# Patient Record
Sex: FEMALE | Race: BLACK OR AFRICAN AMERICAN | Employment: UNEMPLOYED | URBAN - METROPOLITAN AREA
[De-identification: names, ages, dates, MRNs, and addresses within clinical notes are randomized per-mention and may not be internally consistent; named-entity substitution may affect disease eponyms.]

---

## 2020-02-05 ENCOUNTER — HOSPITAL ENCOUNTER (OUTPATIENT)
Dept: PHYSICAL THERAPY | Age: 75
Setting detail: THERAPIES SERIES
Discharge: HOME | End: 2020-02-05
Payer: MEDICARE

## 2020-02-05 PROCEDURE — 97530 THERAPEUTIC ACTIVITIES: CPT | Performed by: CHIROPRACTOR

## 2020-02-05 PROCEDURE — 97161 PT EVAL LOW COMPLEX 20 MIN: CPT | Performed by: CHIROPRACTOR

## 2020-02-05 ASSESSMENT — PAIN SCALES - QUEBEC BACK PAIN DISABILITY SCALE
CLIMB ONE FLIGHT OF STAIRS: 5
WALK SEVERAL KILOMETERS  OR MILES: 5
QUEBEC DISABILITY INDEX: 80-99%
TURN OVER IN BED: 4
BEND OVER TO CLEAN THE BATHTUB: 5
STAND UP FOR 20 TO 30 MINUTES: 4
MOVE A CHAIR: 5
RIDE IN A CAR: 1
THROW A BALL: 5
MAKE YOUR BED: 2
LIFT AND CARRY A HEAVY SUITCASE: 5
SIT IN A CHAIR FOR SEVERAL HOURS: 3
QUEBEC CMS MODIFIER: CM
REACH UP TO HIGH SHELVES: 5
CARRY TWO BAGS OF GROCERIES: 5
PULL OR PUSH HEAVY DOORS: 5
WALK A FEW BLOCKS OR 300 TO 400M: 5
RUN ONE BLOCK OR 100M: 5
TAKE FOOD OUT OF THE REFRIGERATOR: 1
PUT ON SOCKS OR PANYHOSE: 2
GET OUT OF BED: 4
SLEEP THROUGH THE NIGHT: 5
TOTAL SCORE: 81

## 2020-02-05 NOTE — FLOWSHEET NOTE
Physical Therapy Daily Treatment Note  Date:  2020    Patient Name:  Duke Chapa    :  1945  MRN: [id number]    Restrictions/Precautions:  Restrictions/Precautions  Restrictions/Precautions: Fall Risk(Low fall risk)  Pertinent Medical History: Additional Pertinent Hx: B Shoulder pain ( Rot Cuff tears per pt), Neck pain, Fibromyalgia    Medical/Treatment Diagnosis Information:  · Diagnosis: Lumbar Spinal Stenosis, OA R ankle  ·      Insurance/Certification information:   [facility] [employer] Medicare  Physician Information:     Dr. Tiffanie French of care signed (Y/N): Faxed    Visit# / total visits:  1/  Pain level: Ranges 7-10/10     Functional Outcomes Measure:  Test: [place]  Score:   81    Progress Note: []  Yes  []  No  Next due by: Visit #10      History of Injury:   States ankle pain and progressive OA following R ankle fx > 30 yrs ago,, LBP increasing                                          over the last 5 yrs     Subjective:    C/o pain in all her joints    Objective:   Observation:    Test measurements:      Exercises:  Exercise/Equipment Resistance/Repetitions Other comments        Reviewed home exer, posture and body mechanics          Given pool tour                                                          Other Therapeutic Activities:      Home Exercise Program:  Voiced understanding of home exer    Manual Treatments:     Modalities:     Progression Towards Functional goals:  [] Patient is progressing as expected towards functional goals listed. [] Progression is slowed due to complexities listed. [] Progression has been slowed due to co-morbidities. [x] Plan just implemented, too soon to assess goals progression  [] Other:    Charges: Therapeutic Exercise:  [] (95186) Provided verbal/tactile cueing for activities to restore or maintain strength, flexibility, endurance, ROM for improvements with self-care, mobility, lifting and ambulation.     Neuromuscular Re-Education  [] (02400) Provided verbal/tactile cueing for activities to restore or maintain balance, coordination, kinesthetic sense, posture, motor skill, proprioception for self-care, mobility, lifting, and ambulation. Therapeutic Activities:    [x] (63850) Provided verbal/tactile cueing to address functional limitations related to loss of mobility, strength, balance, and coordination. Gait Training:  [] (98163) Provided training and instruction to the patient for proper postural muscle recruitment and positioning with ambulation re-education     Home Exercise Program:    [] (29267) Reviewed/Progressed HEP activities related to strengthening, flexibility, endurance, ROM for functional self-care, mobility, lifting and ambulation   [] (69457) Reviewed/Progressed HEP activities related to improving balance, coordination, kinesthetic sense, posture, motor skill, proprioception for self-care, mobility, lifting, and ambulation      Manual Treatments:  MFR / STM / Oscillations-Mobs:  G-I, II, III, IV / Manipulation / MLD  [] (58434) Provided manual therapy to mobilize  soft tissue/joints/fluid for the purpose of modulating pain, promoting relaxation, increasing ROM, reducing/eliminating soft tissue swelling/inflammation/restriction, improving soft tissue extensibility and allowing for proper ROM for normal function with self- care, mobility, lifting and ambulation.         Timed Code Treatment Minutes: 30   Total Treatment Minutes: 45     [x] EVAL (LOW) 43820   [] EVAL (MOD) 45757   [] EVAL (HIGH) 42353   [] RE-EVAL   [] TE (33969) x     [] Aquatic (41314) x  [] NMR (99541)   x  [] Aquatic Group (97359) x  [] Manual (84251) x    [] Ultrasound (84460) x  [x] TA (03399) x 2  [] Mech Traction (87028)  [] Ionto (39116)           [] ES (un) (68210):   [] Vasopump (63615) [] Other:      Assessment  [] Patient tolerated treatment well [] Patient limited by fatigue  [x] Patient limited by pain  [] Patient limited by other medical

## 2020-02-05 NOTE — PROGRESS NOTES
Physical Therapy  Initial Assessment  Date: 2020  Patient Name: Alexandru Leung  MRN: [id number]  : 1945          Restrictions  Restrictions/Precautions  Restrictions/Precautions: Fall Risk(Low fall risk)    Subjective   General  Chart Reviewed: Yes  Patient assessed for rehabilitation services?: Yes  Additional Pertinent Hx: B Shoulder pain ( Rot Cuff tears per pt), Neck pain, Fibromyalgia  Family / Caregiver Present: Yes  Referring Practitioner: Dr. Solomon Silvestre  Referral Date : 20  Diagnosis: Lumbar Spinal Stenosis, OA R ankle  PT Visit Information  Onset Date: (States ankle pain and progressive OA following R ankle fx > 30 yrs ago,, LBP increasing ofver the last 5 yrs )  PT Insurance Information: [employer] Medicare  Total # of Visits to Date: 1  Subjective  Subjective: C/o pain in all her joints  Pain Screening  Patient Currently in Pain: (Ranges between 7/10 and 10/10)     Objective          PROM RLE (degrees)  RLE PROM: (Ankle DF 0, PF 0-30)  PROM LLE (degrees)  LLE PROM: (Ankle DF 0-12, PF 0-50)  PROM RUE (degrees)  RUE PROM: (Shoulder flex 0-60, abd 0-50)  PROM LUE (degrees)  LUE PROM: (Shoulder flex 0-60, abd 0-50)  Spine  Cervical: Decreased 25% in flex, 50% in ext and rotation and 75% in SB  Thoracic:    Lumbar:   Lumbar mobility decreased 50% in flex and > 75% in ext and SB    Strength RLE  Strength RLE: (ankle DF/PF 3-/5, knee flex/ext 4+/5, hip flex 4/5, ext 5/5)  Strength LLE  Strength LLE: (ankle DF/PF 3+/5, knee flex/ext 4+/5, hip flex 4/5, ext 5/5)  Strength RUE  Strength RUE: (Shoulder flex/abd 3-/5 )  Strength LUE  Strength LUE: (Shoulder flex/abd 3-/5)     Additional Measures  Other: Unable to lie supine  Sensation  Overall Sensation Status: [facility]             Ambulation  Ambulation?: (Amb without any assistive device, but with a limp due to R ankle pain and decreased mobility)                            Assessment   Conditions Requiring Skilled Therapeutic Intervention  Body structures, Functions, Activity limitations: Decreased ROM; Increased pain;Decreased strength  Assessment: Prior level of function: Able to complete usual ADL activities  Prognosis: Good  Decision Making: Low Complexity  REQUIRES PT FOLLOW UP: Yes  Activity Tolerance  Activity Tolerance: Patient limited by pain         Plan   Plan  Times per week: 2-3x/wk x 8-12 wks  Current Treatment Recommendations: Strengthening, ROM, Home Exercise Program, Aquatics         OutComes Score  [place] Total Score: 81 (02/05/20 1447)                                               AM-PAC Score             Goals  Short term goals  Time Frame for Short term goals: 6 weeks  Short term goal 1: Be Independent with home exer  Short term goal 2: Decrease pain 25-50%  Long term goals  Time Frame for Long term goals : 12 weeks  Long term goal 1: Decrease pain 50-75%  Long term goal 2: Be able to amb with good gait pattern  Patient Goals   Patient goals :  \"Less pain\"        Mushtaq LOBATO#[id number]

## 2020-02-13 ENCOUNTER — HOSPITAL ENCOUNTER (OUTPATIENT)
Dept: PHYSICAL THERAPY | Age: 75
Setting detail: THERAPIES SERIES
Discharge: HOME | End: 2020-02-13
Payer: MEDICARE

## 2020-02-13 PROCEDURE — 97113 AQUATIC THERAPY/EXERCISES: CPT

## 2020-02-13 NOTE — FLOWSHEET NOTE
nods     Squats  10      Hamstring Curls  10      Hip Flexion  10 Balance: Hip Abduction  10 SLS    Hip Circles  10 Tandem stance    TA set   NBOS eyes open    Glut Set  10 NBOS eyes closed    Hip Extension  Hand to Opposite Knee    Hip Adduction    Box Step     Hip IR   Noodle Stance     Hip ER  Stop/Go Gait    Fig 8's  Switch Gait                Seated:  Functional:    Ankle Pumps  Step up forward    Ankle circles  Step up lateral    Knee flex & ext  Step down    Hip Abd & Adduction  [place] squats    Bicycle   Crate Lifts    Add Set with ball  Lunges forward    LX stab with med ball throws  Lunges lateral    Ankle INV  Lunges retro    Ankle EV  Lower ab curl with noodle      Upper ab curl with ball      Med ball straight lifts      Med ball diagonal lifts      Hydrorider          Noodle:      Leg Press  Deep [place]:    Noodle hang at wall  Jog    Noodle hang deep water  Jumping Jacks    Noodle Bicycle  Heel to toe      Hand to opposite knee      Cross country skier      Rocking Horse        Charges:  Individual Aquatic Therapy:  [] (74495) Provided verbal and tactile cueing for strengthening, flexibility, ROM using the therapeutic properties of water (buoyancy, resistance) for improvements in core control, mobility and ambulation     Aquatic Group:  [] (51593) Provided intermittent verbal and tactile cueing for strengthening, endurance, flexibility and ROM for 2-4 individuals that do not require one-on one patient contact by the therapist     Individual Aquatic Minutes: 30   Aquatic Group Minutes:      [] Aquatic (97944) x  [] Aquatic Group (27120) x    Treatment/Activity Tolerance:  [] Patient tolerated treatment well [] Patient limited by fatique  [x] Patient limited by pain  [] Patient limited by other medical complications  [x] Other: Very slow with amb and exercises. Mod>max cues for gait; pt amb by rotating pelvis to advance LE to avoid knee/ankle movement.  Mod cues for exercises, technique, posture and reps    Prognosis: [] Good [] Fair  [] Poor    Patient Requires Follow-up: [] Yes  [] No    Plan:   [x] Continue per plan of care [] Alter current plan (see comments)  [] Plan of care initiated [] Hold pending MD visit [] Discharge    Plan for Next Session:  With emphasis on stabilization, good posture and exercise technique, gradually progress spinal stabilization exercises to increase strength, flexibility and endurance and to decrease pain and improve function.   Add: Assess and progress as tolerated    Electronically signed by: Aniket Oquendo, PTA  [id number]

## 2020-02-20 ENCOUNTER — APPOINTMENT (OUTPATIENT)
Dept: PHYSICAL THERAPY | Age: 75
End: 2020-02-20
Payer: MEDICARE

## 2020-02-25 ENCOUNTER — APPOINTMENT (OUTPATIENT)
Dept: PHYSICAL THERAPY | Age: 75
End: 2020-02-25
Payer: MEDICARE

## 2020-02-27 ENCOUNTER — APPOINTMENT (OUTPATIENT)
Dept: PHYSICAL THERAPY | Age: 75
End: 2020-02-27
Payer: MEDICARE